# Patient Record
Sex: FEMALE | Race: WHITE | NOT HISPANIC OR LATINO | Employment: OTHER | URBAN - METROPOLITAN AREA
[De-identification: names, ages, dates, MRNs, and addresses within clinical notes are randomized per-mention and may not be internally consistent; named-entity substitution may affect disease eponyms.]

---

## 2017-01-06 DIAGNOSIS — I48.0 PAROXYSMAL ATRIAL FIBRILLATION (HCC): ICD-10-CM

## 2017-01-06 RX ORDER — FLECAINIDE ACETATE 100 MG/1
50 TABLET ORAL 2 TIMES DAILY
Qty: 60 TABLET | Refills: 3 | Status: SHIPPED | OUTPATIENT
Start: 2017-01-06 | End: 2018-03-02 | Stop reason: SDUPTHER

## 2017-04-12 ENCOUNTER — OFFICE VISIT (OUTPATIENT)
Dept: CARDIOLOGY | Facility: CLINIC | Age: 77
End: 2017-04-12

## 2017-04-12 VITALS
HEART RATE: 51 BPM | DIASTOLIC BLOOD PRESSURE: 68 MMHG | WEIGHT: 156 LBS | BODY MASS INDEX: 26.63 KG/M2 | HEIGHT: 64 IN | SYSTOLIC BLOOD PRESSURE: 122 MMHG

## 2017-04-12 DIAGNOSIS — I48.0 PAROXYSMAL ATRIAL FIBRILLATION (HCC): Primary | ICD-10-CM

## 2017-04-12 PROCEDURE — 93000 ELECTROCARDIOGRAM COMPLETE: CPT | Performed by: INTERNAL MEDICINE

## 2017-04-12 PROCEDURE — 99213 OFFICE O/P EST LOW 20 MIN: CPT | Performed by: INTERNAL MEDICINE

## 2017-04-12 NOTE — PROGRESS NOTES
Subjective:     Encounter Date:04/12/2017      Patient ID: Sherly Jose is a 77 y.o. female.    Chief Complaint: PAF, SDH    History of Present Illness     Dear Dr. Friedman,    I had the pleasure of seeing this patient in cardiac follow up today.  As you well know, she is a doc 77-year-old woman with history of paroxysmal atrial fibrillation.  She was maintained on Xarelto for some time by Dr. Madison.    She developed a head injury and was found to have a subdural hematoma.  She discontinued her Xarelto at that time.    It has been 6 months since her last visit to the office.  At that time, she saw my physician’s assistant, who recommended that she go back on anticoagulation for stroke prevention.  At this time, she chose Pradaxa 150 mg twice daily.      Since starting this medication, she reports doing well.  She has no neurologic symptoms.  She has not had any recurrent symptomatic atrial fibrillation.        Review of Systems   All other systems reviewed and are negative.        ECG 12 Lead  Date/Time: 4/12/2017 12:39 PM  Performed by: SETH GARCIA  Authorized by: SETH GARCIA   Comparison: compared with previous ECG   Similar to previous ECG  Rhythm: sinus rhythm  BPM: 51  Comments: NSSTTWA               Objective:     Physical Exam   Constitutional: She is oriented to person, place, and time. She appears well-developed and well-nourished.   HENT:   Head: Normocephalic and atraumatic.   Neck: Normal range of motion. Neck supple.   Cardiovascular: Normal rate, regular rhythm and normal heart sounds.    Pulmonary/Chest: Effort normal and breath sounds normal.   Abdominal: Soft. Bowel sounds are normal.   Musculoskeletal: Normal range of motion.   Neurological: She is alert and oriented to person, place, and time.   Skin: Skin is warm and dry.   Psychiatric: She has a normal mood and affect. Her behavior is normal. Thought content normal.   Vitals reviewed.      Lab Review:       Assessment:           Diagnosis Plan   1. Paroxysmal atrial fibrillation            Plan:        It was a pleasure to see this patient in cardiac follow up today.  She is doing very well from the standpoint of her paroxysmal atrial fibrillation.  It has not been very symptomatic lately.  She has decided to go back on anticoagulation for stroke prevention.  At this time, she is taking Pradaxa without any difficulty.      I have made no changes to her medical regimen.  I have encouraged her to stay physically active.  She will see me again in 1 year or sooner if symptoms warrant.    Atrial Fibrillation and Atrial Flutter  Assessment  • The patient has paroxysmal atrial fibrillation  • This is non-valvular in etiology  • The patient's CHADS2-VASc score is 4  • A AIP5XR0-ERQz score of 2 or more is considered a high risk for a thromboembolic event  • Dabigatran prescribed    Plan  • Attempt to maintain sinus rhythm  • Continue dabigatran for antithrombotic therapy, bleeding issues discussed  • Continue beta blocker for rate control

## 2017-10-16 DIAGNOSIS — I48.0 PAROXYSMAL ATRIAL FIBRILLATION (HCC): ICD-10-CM

## 2017-10-16 RX ORDER — DABIGATRAN ETEXILATE 150 MG/1
150 CAPSULE ORAL 2 TIMES DAILY
Qty: 180 MG | Refills: 3 | Status: SHIPPED | OUTPATIENT
Start: 2017-10-16 | End: 2018-04-16

## 2018-03-02 DIAGNOSIS — I48.0 PAROXYSMAL ATRIAL FIBRILLATION (HCC): ICD-10-CM

## 2018-03-02 RX ORDER — FLECAINIDE ACETATE 100 MG/1
TABLET ORAL
Qty: 86 TABLET | Refills: 0 | Status: SHIPPED | OUTPATIENT
Start: 2018-03-02 | End: 2018-05-30 | Stop reason: SDUPTHER

## 2018-04-16 ENCOUNTER — OFFICE VISIT (OUTPATIENT)
Dept: CARDIOLOGY | Facility: CLINIC | Age: 78
End: 2018-04-16

## 2018-04-16 VITALS
DIASTOLIC BLOOD PRESSURE: 84 MMHG | HEIGHT: 64 IN | OXYGEN SATURATION: 98 % | BODY MASS INDEX: 27.49 KG/M2 | WEIGHT: 161 LBS | SYSTOLIC BLOOD PRESSURE: 130 MMHG | HEART RATE: 50 BPM

## 2018-04-16 DIAGNOSIS — I48.0 PAROXYSMAL ATRIAL FIBRILLATION (HCC): Primary | ICD-10-CM

## 2018-04-16 PROCEDURE — 93000 ELECTROCARDIOGRAM COMPLETE: CPT | Performed by: PHYSICIAN ASSISTANT

## 2018-04-16 PROCEDURE — 99213 OFFICE O/P EST LOW 20 MIN: CPT | Performed by: PHYSICIAN ASSISTANT

## 2018-04-16 RX ORDER — OLMESARTAN MEDOXOMIL AND HYDROCHLOROTHIAZIDE 40/25 40; 25 MG/1; MG/1
1 TABLET ORAL
COMMUNITY
Start: 2017-11-20

## 2018-04-16 NOTE — PROGRESS NOTES
Date of Office Visit: 2018  Encounter Provider: PHILIP Stanley  Place of Service: [facility] CARDIOLOGY  Patient Name: Sherly Jose  :1940    Chief Complaint   Patient presents with   • Atrial Fibrillation     1 year follow up   :     HPI: Sherly Jose is a 78 y.o. female who presents today for follow-up.  Old records have been obtained and reviewed by me.She is a patient of Dr. Corado's with a past cardiac history significant for paroxysmal atrial fibrillation.  In the past she had been maintained on Xarelto for stroke prophylaxis, however she did have a fall and subsequent head injury.  She was found to have a sudden hematoma, and her Xarelto was discontinued.  She has been on flecainide and Toprol XL to try to maintain sinus rhythm.  After discontinuing her Xarelto, she did have some more episodes of atrial fibrillation and ultimately we decided to put her on Pradaxa.  She was last in our office to see Dr. Corado on 2017.  At that visit, she was tolerating the Pradaxa well without any recurrence of her subdural hematoma.  No changes were made to her medical regimen, and she is here today for yearly visit.   Over the past year she's been doing well.  She denies any chest pain, shortness of breath, palpitations, edema, dizziness, or syncope.  She has not had any bleeding issues on the Pradaxa.  She does not think she's had any atrial fibrillation.  Her main complaint today is the cost of the Pradaxa, which is $300 a month.  She is wondering of Eliquis is any cheaper.    Past Medical History:   Diagnosis Date   • A-fib    • Hyperlipidemia    • Hypertension    • Osteopenia    • Pericardial effusion    • Pleural effusion    • Skin cancer     SQUAMOUS CELL, RIGHT THIGH       Past Surgical History:   Procedure Laterality Date   • CARDIAC CATHETERIZATION     • CATARACT EXTRACTION     • COLONOSCOPY     • HYSTERECTOMY     • THORACOTOMY     • TONSILLECTOMY          Social History     Social History   • Marital status:      Spouse name: N/A   • Number of children: N/A   • Years of education: N/A     Occupational History   • Not on file.     Social History Main Topics   • Smoking status: Former Smoker     Packs/day: 1.00     Years: 30.00     Quit date: 3/15/1991   • Smokeless tobacco: Never Used   • Alcohol use 1.8 oz/week     1 Glasses of wine, 1 Cans of beer, 1 Shots of liquor per week      Comment: EVERY WEEKEND   • Drug use: No   • Sexual activity: Defer     Other Topics Concern   • Not on file     Social History Narrative   • No narrative on file       Family History   Problem Relation Age of Onset   • Arrhythmia Mother    • Prostate cancer Father    • Hypertension Sister    • Parkinsonism Brother    • No Known Problems Maternal Grandmother    • Tuberculosis Maternal Grandfather    • Diabetes Paternal Grandmother    • Stroke Paternal Grandfather    • Hypertension Brother    • Hypertension Brother    • Lung cancer Sister        Review of Systems   Constitution: Negative for chills, fever and malaise/fatigue.   Cardiovascular: Negative for chest pain, dyspnea on exertion, leg swelling, near-syncope, orthopnea, palpitations, paroxysmal nocturnal dyspnea and syncope.   Respiratory: Negative for cough and shortness of breath.    Musculoskeletal: Negative for joint pain, joint swelling and myalgias.   Gastrointestinal: Negative for abdominal pain, diarrhea, melena, nausea and vomiting.   Genitourinary: Negative for frequency and hematuria.   Neurological: Negative for light-headedness, numbness, paresthesias and seizures.   Allergic/Immunologic: Negative.    All other systems reviewed and are negative.      No Known Allergies      Current Outpatient Prescriptions:   •  amLODIPine (NORVASC) 5 MG tablet, Take 5 mg by mouth daily., Disp: , Rfl:   •  Calcium Carbonate-Vitamin D (CALCIUM + D PO), Take 1 tablet by mouth Daily., Disp: , Rfl:   •  dabigatran etexilate  "(PRADAXA) 150 MG capsu, Take 1 capsule by mouth 2 (Two) Times a Day., Disp: 180 mg, Rfl: 3  •  estradiol (ESTRACE) 0.5 MG tablet, Take 0.5 mg by mouth daily., Disp: , Rfl:   •  flecainide (TAMBOCOR) 100 MG tablet, TAKE ONE-HALF TABLET BY MOUTH TWICE A DAY, Disp: 86 tablet, Rfl: 0  •  furosemide (LASIX) 20 MG tablet, Take 1 tablet by mouth Daily. (Patient taking differently: Take 20 mg by mouth Daily As Needed (FOR SWELLING).), Disp: 90 tablet, Rfl: 3  •  metoprolol succinate XL (TOPROL-XL) 50 MG 24 hr tablet, Take 50 mg by mouth daily., Disp: , Rfl:   •  Multiple Vitamin (MULTI VITAMIN DAILY PO), Take 1 capsule by mouth daily., Disp: , Rfl:   •  olmesartan-hydrochlorothiazide (BENICAR HCT) 40-25 MG per tablet, Take 1 tablet by mouth., Disp: , Rfl:   •  Omega-3 Fatty Acids (OMEGA 3 PO), Take  by mouth., Disp: , Rfl:       Objective:     Vitals:    04/16/18 1325 04/16/18 1337   BP: 124/76 130/84   BP Location: Right arm Left arm   Pulse: 50    SpO2: 98%    Weight: 73 kg (161 lb)    Height: 162.6 cm (64\")      Body mass index is 27.64 kg/m².    PHYSICAL EXAM:    Physical Exam   Constitutional: She is oriented to person, place, and time. She appears well-developed and well-nourished. No distress.   HENT:   Head: Normocephalic and atraumatic.   Eyes: Pupils are equal, round, and reactive to light.   Neck: No JVD present. No thyromegaly present.   Cardiovascular: Normal rate, regular rhythm, normal heart sounds and intact distal pulses.    No murmur heard.  Pulmonary/Chest: Effort normal and breath sounds normal. No respiratory distress.   Abdominal: Soft. Bowel sounds are normal. She exhibits no distension. There is no splenomegaly or hepatomegaly. There is no tenderness.   Musculoskeletal: Normal range of motion. She exhibits no edema.   Neurological: She is alert and oriented to person, place, and time.   Skin: Skin is warm and dry. She is not diaphoretic. No erythema.   Psychiatric: She has a normal mood and affect. " Her behavior is normal. Judgment normal.         ECG 12 Lead  Date/Time: 4/16/2018 1:45 PM  Performed by: MELY LONG.  Authorized by: MELY LONG   Comparison: compared with previous ECG from 4/12/2017  Similar to previous ECG  Rhythm: sinus rhythm  BPM: 50  T depression: V1, V2, V3, V4 and V5  T flattening: V6  Clinical impression: abnormal ECG  Comments: Indication: Paroxysmal atrial fibrillation.              Assessment:       Diagnosis Plan   1. Paroxysmal atrial fibrillation  ECG 12 Lead     Orders Placed This Encounter   Procedures   • ECG 12 Lead     This order was created via procedure documentation          Plan:       1.  Atrial Fibrillation and Atrial Flutter  Assessment  • The patient has paroxysmal atrial fibrillation  • This is non-valvular in etiology  • The patient's CHADS2-VASc score is 4  • A [id number]-IMRz score of 2 or more is considered a high risk for a thromboembolic event  • Dabigatran prescribed    Plan  • Attempt to maintain sinus rhythm  • Continue dabigatran for antithrombotic therapy, bleeding issues discussed  • Continue beta blocker and flecainide for rhythm control  • Continue beta blocker for rate control    Subjective - Objective  • Overall she stable and doing well.  I sent a prescription for Eliquis into her pharmacy to see if it's any less expensive than the Pradaxa.  I've asked her to let me know if she decides to switch medications.  Otherwise we'll just see her back in a year with Dr. Corado.        As always, it has been a pleasure to participate in your patient's care.      Sincerely,         Mely Long PA-C

## 2018-05-30 DIAGNOSIS — I48.0 PAROXYSMAL ATRIAL FIBRILLATION (HCC): ICD-10-CM

## 2018-05-31 RX ORDER — FLECAINIDE ACETATE 100 MG/1
TABLET ORAL
Qty: 86 TABLET | Refills: 0 | Status: SHIPPED | OUTPATIENT
Start: 2018-05-31 | End: 2018-08-23 | Stop reason: SDUPTHER

## 2018-08-23 DIAGNOSIS — I48.0 PAROXYSMAL ATRIAL FIBRILLATION (HCC): ICD-10-CM

## 2018-08-23 RX ORDER — FLECAINIDE ACETATE 100 MG/1
TABLET ORAL
Qty: 86 TABLET | Refills: 0 | Status: SHIPPED | OUTPATIENT
Start: 2018-08-23 | End: 2018-11-21 | Stop reason: SDUPTHER

## 2018-11-21 DIAGNOSIS — I48.0 PAROXYSMAL ATRIAL FIBRILLATION (HCC): ICD-10-CM

## 2018-11-21 RX ORDER — FLECAINIDE ACETATE 100 MG/1
TABLET ORAL
Qty: 86 TABLET | Refills: 0 | Status: SHIPPED | OUTPATIENT
Start: 2018-11-21 | End: 2019-01-25 | Stop reason: SDUPTHER

## 2019-01-25 DIAGNOSIS — I48.0 PAROXYSMAL ATRIAL FIBRILLATION (HCC): ICD-10-CM

## 2019-01-25 RX ORDER — FLECAINIDE ACETATE 100 MG/1
TABLET ORAL
Qty: 86 TABLET | Refills: 2 | Status: SHIPPED | OUTPATIENT
Start: 2019-01-25 | End: 2019-05-20 | Stop reason: SDUPTHER

## 2019-04-22 ENCOUNTER — OFFICE VISIT (OUTPATIENT)
Dept: CARDIOLOGY | Facility: CLINIC | Age: 79
End: 2019-04-22

## 2019-04-22 VITALS
WEIGHT: 159 LBS | SYSTOLIC BLOOD PRESSURE: 142 MMHG | HEIGHT: 64 IN | DIASTOLIC BLOOD PRESSURE: 68 MMHG | BODY MASS INDEX: 27.14 KG/M2 | HEART RATE: 59 BPM

## 2019-04-22 DIAGNOSIS — I87.2 VENOUS INSUFFICIENCY: ICD-10-CM

## 2019-04-22 DIAGNOSIS — I48.0 PAROXYSMAL ATRIAL FIBRILLATION (HCC): Primary | ICD-10-CM

## 2019-04-22 PROCEDURE — 99213 OFFICE O/P EST LOW 20 MIN: CPT | Performed by: INTERNAL MEDICINE

## 2019-04-22 PROCEDURE — 93000 ELECTROCARDIOGRAM COMPLETE: CPT | Performed by: INTERNAL MEDICINE

## 2019-04-22 RX ORDER — HYDRALAZINE HYDROCHLORIDE 25 MG/1
25 TABLET, FILM COATED ORAL 2 TIMES DAILY
COMMUNITY
Start: 2019-04-19

## 2019-05-13 RX ORDER — APIXABAN 5 MG/1
TABLET, FILM COATED ORAL
Qty: 180 TABLET | Refills: 10 | Status: SHIPPED | OUTPATIENT
Start: 2019-05-13

## 2019-05-19 ENCOUNTER — PATIENT MESSAGE (OUTPATIENT)
Dept: CARDIOLOGY | Facility: CLINIC | Age: 79
End: 2019-05-19

## 2019-05-19 DIAGNOSIS — I48.0 PAROXYSMAL ATRIAL FIBRILLATION (HCC): ICD-10-CM

## 2019-05-20 RX ORDER — FLECAINIDE ACETATE 100 MG/1
50 TABLET ORAL 2 TIMES DAILY
Qty: 90 TABLET | Refills: 1 | Status: SHIPPED | OUTPATIENT
Start: 2019-05-20

## 2019-05-20 RX ORDER — FUROSEMIDE 20 MG/1
20 TABLET ORAL DAILY PRN
Qty: 90 TABLET | Refills: 1 | Status: SHIPPED | OUTPATIENT
Start: 2019-05-20

## 2019-05-20 NOTE — TELEPHONE ENCOUNTER
From: Sherly Jose  To: Froy Corado MD  Sent: 5/19/2019 12:18 PM EDT  Subject: Prescription Question    I also take Flecanide 20 mg. bid   and Lasix 20 mg prn.  Please refill  Preet Jose

## 2020-02-12 ENCOUNTER — TELEPHONE (OUTPATIENT)
Dept: CARDIOLOGY | Facility: CLINIC | Age: 80
End: 2020-02-12

## 2020-02-12 NOTE — TELEPHONE ENCOUNTER
----- Message from Nina Melendrez MA sent at 2/11/2020  5:13 PM EST -----  Regarding: RE: 1 Yr FU Appt  She's fine. I told pt to call her PCP to step up getting Rx's through her. Pt verbalized understanding.    Thanks anyway Nina Devlin RMA    ----- Message -----  From: Stephani Clifton  Sent: 2/11/2020   4:58 PM EST  To: Nina Melendrez MA  Subject: RE: 1 Yr FU Appt                                 2/11 stp. She doesn't want to see a cardiologist. She wanted to know if her pcp could handle these meds. Transferred her to Nina.  Let me know what I need to do.  Thank you,  Stephani      ----- Message -----  From: Nina Melendrez MA  Sent: 2/11/2020   4:25 PM EST  To: Stephani Clifton  Subject: 1 Yr FU Appt                                     Mrs. Styles,    Please call and get this pt scheduled for a 1 yr FU as transference of care from Dr. Corado. Pt wants refills. Please inform her I will only fill her Rx for 90 days, as that should get her through until she's seen again. I don't have a provider to put the Rx's under because she cancelled her appt for 4/2020.    Thanks,   TYRESE Wood